# Patient Record
(demographics unavailable — no encounter records)

---

## 2024-11-29 NOTE — DISCUSSION/SUMMARY
[FreeTextEntry1] : 11 year boy found to be rapid strep positive. Complete 10 days of antibiotics. Side effect of antibiotics includes but not limited to diarrhea. Use antipyretics as needed.  After being on antibiotics for atleast 24 hours patient less likely to spread infection. - Return precautions reviewed. Patient to seek medical attention in ED if has decreased oral intake, decrease in wet diapers/voids, fever >100.4F, difficulty breathing, becomes lethargic, or has a change in mental status or alertness. To note if fever > 5 days must be seen immediately either in clinic or in ED. - Return/ED precautions given.  RTC routine and prn.  Caretaker expressed understanding and all questions answered.

## 2024-11-29 NOTE — PHYSICAL EXAM
[NL] : warm, clear [Erythematous Oropharynx] : erythematous oropharynx [Rebound tenderness] : no rebound tenderness [FreeTextEntry9] : ttp diffusely on abdomen mild, no guarding

## 2024-11-29 NOTE — HISTORY OF PRESENT ILLNESS
[de-identified] : sick [FreeTextEntry6] : 12y/o M pmhx seasonal allergies p/w abdominal pain 5d ago, vomiting 4d ago since resolved, and a couple of days og congestion, slight cough, sore dry throat, left ear pain.  Had some nausea but is better.  Tolerating PO normally now.  No diarrhea.  No fever.